# Patient Record
Sex: MALE | Race: WHITE
[De-identification: names, ages, dates, MRNs, and addresses within clinical notes are randomized per-mention and may not be internally consistent; named-entity substitution may affect disease eponyms.]

---

## 2019-12-26 ENCOUNTER — HOSPITAL ENCOUNTER (EMERGENCY)
Dept: HOSPITAL 74 - FER | Age: 64
Discharge: HOME | End: 2019-12-26
Payer: COMMERCIAL

## 2019-12-26 VITALS — SYSTOLIC BLOOD PRESSURE: 116 MMHG | HEART RATE: 84 BPM | TEMPERATURE: 99.5 F | DIASTOLIC BLOOD PRESSURE: 63 MMHG

## 2019-12-26 VITALS — BODY MASS INDEX: 25.3 KG/M2

## 2019-12-26 DIAGNOSIS — I10: ICD-10-CM

## 2019-12-26 DIAGNOSIS — N40.0: ICD-10-CM

## 2019-12-26 DIAGNOSIS — Z79.82: ICD-10-CM

## 2019-12-26 DIAGNOSIS — Z88.8: ICD-10-CM

## 2019-12-26 DIAGNOSIS — R05: ICD-10-CM

## 2019-12-26 DIAGNOSIS — C90.00: ICD-10-CM

## 2019-12-26 DIAGNOSIS — B34.9: Primary | ICD-10-CM

## 2019-12-26 DIAGNOSIS — Z92.21: ICD-10-CM

## 2019-12-26 LAB
ALBUMIN SERPL-MCNC: 3.5 G/DL (ref 3.4–5)
ALP SERPL-CCNC: 42 U/L (ref 45–117)
ALT SERPL-CCNC: 18 U/L (ref 13–61)
ANION GAP SERPL CALC-SCNC: 9 MMOL/L (ref 8–16)
APTT BLD: 28.8 SECONDS (ref 25.2–36.5)
AST SERPL-CCNC: 19 U/L (ref 15–37)
BASOPHILS # BLD: 0.4 % (ref 0–2)
BILIRUB SERPL-MCNC: 1.4 MG/DL (ref 0.2–1)
BUN SERPL-MCNC: 15 MG/DL (ref 7–18)
CALCIUM SERPL-MCNC: 8 MG/DL (ref 8.5–10)
CHLORIDE SERPL-SCNC: 102 MMOL/L (ref 98–107)
CO2 SERPL-SCNC: 27 MMOL/L (ref 21–32)
CREAT SERPL-MCNC: 0.7 MG/DL (ref 0.55–1.3)
DEPRECATED RDW RBC AUTO: 14.8 % (ref 11.9–15.9)
EOSINOPHIL # BLD: 2.2 % (ref 0–4.5)
GLUCOSE SERPL-MCNC: 100 MG/DL (ref 74–106)
HCT VFR BLD CALC: 36.3 % (ref 35.4–49)
HGB BLD-MCNC: 11.5 GM/DL (ref 11.7–16.9)
INR BLD: 1.25 (ref 0.82–1.09)
LYMPHOCYTES # BLD: 13.3 % (ref 8–40)
MCH RBC QN AUTO: 32.9 PG (ref 25.7–33.7)
MCHC RBC AUTO-ENTMCNC: 31.8 G/DL (ref 32–35.9)
MCV RBC: 103.3 FL (ref 80–96)
MONOCYTES # BLD AUTO: 8.1 % (ref 3.8–10.2)
MUCOUS THREADS URNS QL MICRO: (no result)
NEUTROPHILS # BLD: 76 % (ref 42.8–82.8)
PLATELET # BLD AUTO: 104 K/MM3 (ref 134–434)
PMV BLD: 7.6 FL (ref 7.5–11.1)
POTASSIUM SERPLBLD-SCNC: 3.3 MMOL/L (ref 3.5–5.1)
PROT SERPL-MCNC: 5.7 G/DL (ref 6.4–8.2)
PT PNL PPP: 13.9 SEC (ref 10.2–13)
RBC # BLD AUTO: 3.51 M/MM3 (ref 4–5.6)
SODIUM SERPL-SCNC: 138 MMOL/L (ref 136–145)
WBC # BLD AUTO: 4.6 K/MM3 (ref 4–10.8)

## 2019-12-26 PROCEDURE — 3E0337Z INTRODUCTION OF ELECTROLYTIC AND WATER BALANCE SUBSTANCE INTO PERIPHERAL VEIN, PERCUTANEOUS APPROACH: ICD-10-PCS | Performed by: EMERGENCY MEDICINE

## 2019-12-26 NOTE — PDOC
History of Present Illness





- General


Chief Complaint: Respiratory


Stated Complaint: FEVER


Time Seen by Provider: 19 09:45


History Source: Patient


Exam Limitations: No Limitations





- History of Present Illness


Initial Comments: 


64 year old male with PMH HTN, BPH (recently started on flomax, recent urinary 

cath removed), multiple myeloma (last neutropenic fever 2018) presented to 

ED for fever since last night. Pt reported dry cough, body aches, sore throat. 

Pt denied nausea, vomiting, diarrhea, chest pain, shortness of breath, rash, 

dysuria, increased urinary frequency, headache. Pt reported he had a urinary 

catheter placed in the last couple of weeks for BPH, saw urology outpatient, 

had it removed and was started on Flomax. 





ROS


General: denied fever, chills, generalized weakness.


HEENT: denied sore throat, rhinorrhea, ear pain.


Cardiovascular: denied chest pain, palpitations, syncope, diaphoresis.


Respiratory: denied shortness of breath, cough, sputum production, hemoptysis.


Gastrointestinal: denied abdominal pain, nausea, vomiting, diarrhea, 

constipation, blood in stool.


Genitourinary: denied dysuria, increased urinary frequency, hematuria, urinary 

incontinence, flank pain.


Back: denied back pain.


Musculoskeletal: denied joint pain, muscle pain, joint swelling.


Neurological: denied headache, dizziness, numbness, tingling, weakness. 


Integumentary: denied rash, laceration, abrasion.


Hematologic/Lymphatic: denied bruising or bleeding. 





PE


Constitutional: Well-nourished, Well-developed, appearing stated age.


HEENT: head is normocephalic, atraumatic. EOMI. PERRLA. no posterior pharyngeal 

erythema. no tonsillar swelling or exudates bilaterally. uvula midline. no 

peritonsillar swelling, tenderness or abscess. no jaw tenderness or 

misalignment. 


Neck: supple. Full ROM.


Cardiovascular: regular heart rhythm. no murmurs. no pericardial friction rub. 


Respiratory: clear to auscultation bilaterally, but decreased breath sounds on 

the right. no crackles, rhonchi or wheezing. no stridor.


Gastrointestinal: soft, nontender. normal bowel sounds. no rebound, guarding, 

masses. 


Extremities: peripheral pulses intact. no lower extremity edema.


Neurological: CN 2-12 grossly intact. moves all four extremities. 


Psych: awake, alert, oriented x3. follows commands. answers questions 

appropriately. 








Past History





- Past Medical History


Allergies/Adverse Reactions: 


 Allergies











Allergy/AdvReac Type Severity Reaction Status Date / Time


 


epinephrine Allergy Severe Low Blood Verified 19 09:48





   Pressure  











Home Medications: 


Ambulatory Orders





Aspirin [Aspir-Low] 81 mg PO HS 14 


Multivitamin [Daily Vitamin] 1 each PO DAILY  tablet 14 


Divalproex Sodium [Depakote] 250 mg PO DAILY  tablet 10/20/14 


Gabapentin 300 mg PO HS  capsule 10/20/14 


Rosuvastatin Calcium [Crestor] 5 mg PO BIW  tablet 01/20/15 


Niacinamide [Niacin] 500 mg PO DAILY  tablet 12/07/15 


clonazePAM [KlonoPIN] 0.5 mg PO TID  tablet 17 


Desvenlafaxine Succinate [Pristiq] 100 mg PO DAILY  tablet 17 


Lurasidone HCl [Latuda -] 20 mg PO DAILY  tablet 17 


Dexamethasone  18 


Divalproex [Depakote -] 750 mg PO HS 18 


Lovastatin 10 mg PO HS 18 


Valacyclovir HCl [Valtrex] 500 mg PO DAILY 18 


Velcade  18 


Acetaminophen [Tylenol .Regular Strength -] 650 mg PO Q6H PRN  tablet 08/15/18 


Doxycycline Hyclate 100 mg PO BID #28 capsule 08/15/18 


Lidocaine 2% Jelly [Xylocaine 2% Jelly -] 1 applic TP BID #1 tube 19 


Azithromycin [Zithromax 250mg Tablets -] 250 mg PO UTDICT #6 tab 19 











- Psycho Social/Smoking Cessation Hx


Smoking History: Unknown if ever smoked


Have you smoked in the past 12 months: No


Number of Cigarettes Smoked Daily: 0


If you are a former smoker, when did you quit?: 


Hx Alcohol Use: Yes (SOCIAL)


Drug/Substance Use Hx: No


Substance Use Type: Alcohol


Hx Substance Use Treatment: No





ED Treatment Course





- LABORATORY


CBC & Chemistry Diagram: 


 19 10:28





 19 10:28





Medical Decision Making





- Medical Decision Making


64 year old male with above PMH presented to ED for cough, body aches, fever 

since last night. 





 Initial Vital Signs











Temp Pulse Resp BP Pulse Ox


 


 100 F H  116 H  20   139/84   97 


 


 19 09:39  19 09:39  19 09:39  19 09:39  19 09:39














Labs ordered: sepsis order set, influenza


Imaging ordered: CXR


Medications ordered: normal saline bolus 1000 cc once





19 10:16


Pt;s Oncologist Dr. Green called, [phone], reported she would probably 

recommend pt be admitted, will call back with more lab work. 





19 11:16


CXR report: Name: [facility] DEPARTMENT OF RADIOLOGY Phys: Devika Rojas 

RESIDENT : [id number] Age: 64 Sex: M [facility] Acct: 

[id number] Loc: [address] Exam Date: 19 Status: [facility] 

[place] [address] Unit Number: [id number] ACCESSION #: [id number] EXAM

#: TYPE/EXAM: RESULT: [id number] RAD/CHEST PA LAT Chest: Fever and cough. 2 

views of the chest reveal scoliosis with convexity to the right, clear well 

aerated lungs, normal mediastinum and sharp angles. The soft tissues are 

intact. Since 2019, there is no change of an adverse nature. Impression: 

No acute chest pathology. Reported By: Eliseo Briseno MD 19 [id number] 





19 12:18


 Laboratory Last Values











WBC  4.6 K/mm3 (4.0-10.8)   19  10:28    


 


RBC  3.51 M/mm3 (4.00-5.60)  L  19  10:28    


 


Hgb  11.5 GM/dl (11.7-16.9)  L  19  10:28    


 


Hct  36.3 % (35.4-49)   19  10:28    


 


MCV  103.3 fl (80-96)  H  19  10:28    


 


MCH  32.9 pg (25.7-33.7)   19  10:28    


 


MCHC  31.8 g/dl (32.0-35.9)  L  19  10:28    


 


RDW  14.8 % (11.9-15.9)   19  10:28    


 


Plt Count  104 K/MM3 (134-434)  L D 19  10:28    


 


MPV  7.6 fl (7.5-11.1)   19  10:28    


 


Absolute Neuts (auto)  3.5 K/mm3  19  10:28    


 


Neutrophils %  76.0 % (42.8-82.8)   19  10:28    


 


Lymphocytes %  13.3 % (8-40)  D 19  10:28    


 


Monocytes %  8.1 % (3.8-10.2)   19  10:28    


 


Eosinophils %  2.2 % (0-4.5)  D 19  10:28    


 


Basophils %  0.4 % (0-2.0)   19  10:28    


 


PT with INR  13.9 SEC (10.2-13.0)  H  19  10:30    


 


INR  1.25  (0.82-1.09)  H  19  10:30    


 


PTT (Actin FS)  28.8 SECONDS (25.2-36.5)   19  10:30    


 


Sodium  138 mmol/L (136-145)   19  10:28    


 


Potassium  3.3 mmol/L (3.5-5.1)  L  19  10:28    


 


Chloride  102 mmol/L ()   19  10:28    


 


Carbon Dioxide  27 mmol/L (21-32)   19  10:28    


 


Anion Gap  9 MMOL/L (8-16)   19  10:28    


 


BUN  15.0 mg/dl (7-18)   19  10:28    


 


Creatinine  0.7 mg/dl (0.55-1.3)   19  10:28    


 


Est GFR (CKD-EPI)AfAm  116.40   19  10:28    


 


Est GFR (CKD-EPI)NonAf  100.43   19  10:28    


 


Random Glucose  100 mg/dl ()   19  10:28    


 


Lactic Acid  1.0 mmol/L (0.4-2.0)   19  10:28    


 


Calcium  8.0 mg/dl (8.5-10)  L  19  10:28    


 


Total Bilirubin  1.4 mg/dl (0.2-1)  H  19  10:28    


 


AST  19 U/L (15-37)   19  10:28    


 


ALT  18 U/L (13-61)   19  10:28    


 


Alkaline Phosphatase  42 U/L ()  L  19  10:28    


 


Troponin I  0.03 ng/ml (0.00-0.05)   19  10:30    


 


Total Protein  5.7 g/dl (6.4-8.2)  L  19  10:28    


 


Albumin  3.5 g/dl (3.4-5.0)   19  10:28    


 


Influenza A (Rapid)  Negative  (Negative)   19  10:35    


 


Influenza B (Rapid)  Negative  (Negative)   19  10:35    








I discussed the results with Oncologist Dr. Green, she reported based on 

current lab results and pts current clinical status he could likely be 

discharged. Will wait for UA and rediscuss. 





19 12:26


 Urine Test Results











Urine Color  Yellow   19  10:18    


 


Urine Appearance  Clear   19  10:18    


 


Urine pH  6.5  (4.5-8)   19  10:18    


 


Urine Protein  1+  (NEGATIVE)  H  19  10:18    


 


Urine Glucose (UA)  Negative  (NEGATIVE)   19  10:18    


 


Urine Ketones  1+  (NEGATIVE)  H  19  10:18    


 


Urine Blood  Negative  (NEGATIVE)   19  10:18    


 


Urine Nitrite  Negative  (NEGATIVE)   19  10:18    


 


Urine Bilirubin  1+  (NEGATIVE)  H  19  10:18    


 


Ur Leukocyte Esterase  Negative  (NEGATIVE)   19  10:18    








Negative for UTI. 





19 12:29


 Vital Signs











Temperature  99.5 F   19 11:30


 


Pulse Rate  84   19 11:30


 


Respiratory Rate  20   19 11:30


 


Blood Pressure  116/63   19 11:30


 


O2 Sat by Pulse Oximetry (%)  99   19 11:30








Pt reported improvement of symptoms. 


Fever improved with outpatient Tylenol use. 


Tachycardia improved with fever control and IV fluid hydration. 





Dr. Green advised discharge with Z-pack. 


Pt discharged, advised to F/U with Oncology Tuesday and PCP outpatient. Hold 

Rev until symptoms improve.


Discussed with patient, he agreed with plan for care. 











Discharge





- Discharge Information


Problems reviewed: Yes


Clinical Impression/Diagnosis: 


 Viral syndrome, Cough





Condition: Improved


Disposition: HOME





- Admission


No





- Additional Discharge Information


Prescriptions: 


Azithromycin [Zithromax 250mg Tablets -] 250 mg PO UTDICT #6 tab





- Follow up/Referral


Referrals: 


Ignacio Guthrie MD [Primary Care Provider] - 





- Patient Discharge Instructions


Patient Printed Discharge Instructions:  DI for Viral Syndrome


Additional Instructions: 


Follow up with your primary care doctor within 3 days regarding your Emergency 

Room visit. Your care is not complete until you follow up. 





Follow up with your oncologist ON TUESDAY in the office regarding your 

Emergency Room visit. Your care is not complete until you follow up. Call the 

office today and tell them you were told you were going to be given an 

appointment. 





Drink lots of fluids to stay hydrated. 


TAKE Tylenol over the counter for pain/headache/fever. Take as advised on 

label. 


Wear a mask when out in public until you have been fever free for 24 hours. 


Wash your hands a lot to prevent spreading your infection. 





I have sent a prescription to your pharmacy for a Z-pack (antibiotic), take as 

advised on label. 


STOP taking your Revlemid until you are evaluated by your Oncologist. 





Return to the Emergency Department for increasing pain, increasing fever, chest 

pain, shortness of breath, lightheadedness, passing out, increasing weakness or 

any other new, worsening or concerning symptoms. 





- Post Discharge Activity

## 2019-12-27 NOTE — EKG
Test Reason : 

Blood Pressure : ***/*** mmHG

Vent. Rate : 091 BPM     Atrial Rate : 091 BPM

   P-R Int : 136 ms          QRS Dur : 086 ms

    QT Int : 372 ms       P-R-T Axes : 059 054 076 degrees

   QTc Int : 457 ms

 

NORMAL SINUS RHYTHM

SEPTAL INFARCT , AGE UNDETERMINED

ABNORMAL ECG

WHEN COMPARED WITH ECG OF 11-AUG-2018 17:01,

NO SIGNIFICANT CHANGE WAS FOUND

Confirmed by TAWNYA THOMPSON MD ([id number]) on 12/27/2019 1:38:17 PM

 

Referred By: JOAQUIN CHAVIS           Confirmed By:TAWNYA THOMPSON MD

## 2019-12-27 NOTE — PDOC
Attending Attestation





- Resident


Resident Name: Devika Rojas





- ED Attending Attestation


I have performed the following: I have examined & evaluated the patient, The 

case was reviewed & discussed with the resident, I agree w/resident's findings 

& plan, Exceptions are as noted





- HPI


HPI: 





12/27/19 14:51


64-year-old male history of multiple myeloma currently getting oral chemo also 

history of BPH with a recent urinary indwelling catheter which was removed 

currently on Flomax here today complaining of subjective fevers and chills.  

Patient states symptoms started last night.  He has had some generalized body 

aches and cough denies any nausea vomiting no abdominal pain denies any new 

known sick contacts no rash noted.  States that he has had a history of 

neutropenia in the past but his most recent labs were normal.  He follows with 

oncologist Dr. Green





- Physicial Exam


PE: 





12/27/19 14:52


Patient is awake alert no acute distress has moist mucous membranes.  There is 

some clear rhinorrhea lungs are clear bilaterally no appreciated crackles or 

wheezes.  Heart is regular no murmurs rubs or gallops abdomen is soft nontender 

extremities are warm and well-perfused skin is warm and dry no appreciated rash 

patient is awake alert and oriented x3





- Medical Decision Making





12/27/19 14:52


64-year-old male history of multiple myeloma currently on chemo here today 

complaining of subjective fevers chills Reiger's and body aches.  With a known 

recent urinary catheterization.  On my exam he is well-appearing there is no 

abdominal tenderness differential includes a viral URI, influenza, UTI, other 

infectious source.  Will rule out neutropenia as the patient has a history and 

is currently immunosuppressed.





Chest x-ray was negative for any acute infection UA was negative for UTI viral 

swab was sent and is negative.  To the fact the patient is immunosuppressed 

with high risk and recently has had a cough we will treat him with azithromycin 

case and plan discussed with his oncologist Dr. Green who supports the 

decision.  With a negative flu swab will hold Tamiflu at this point as it is 

with minimal benefit and likely not indicated.  Told to follow-up on Tuesday 5 

days from today and told to return sooner for any worsening shortness of breath 

persistent fevers nausea vomiting or any concerns

## 2022-06-01 ENCOUNTER — HOSPITAL ENCOUNTER (OUTPATIENT)
Dept: HOSPITAL 74 - FASU | Age: 67
Discharge: HOME | End: 2022-06-01
Attending: ORTHOPAEDIC SURGERY
Payer: COMMERCIAL

## 2022-06-01 VITALS — BODY MASS INDEX: 25.9 KG/M2

## 2022-06-01 VITALS — TEMPERATURE: 97.2 F | DIASTOLIC BLOOD PRESSURE: 57 MMHG | SYSTOLIC BLOOD PRESSURE: 118 MMHG | HEART RATE: 71 BPM

## 2022-06-01 DIAGNOSIS — G56.01: Primary | ICD-10-CM

## 2022-06-01 PROCEDURE — 01N50ZZ RELEASE MEDIAN NERVE, OPEN APPROACH: ICD-10-PCS | Performed by: ORTHOPAEDIC SURGERY
